# Patient Record
Sex: MALE | Race: BLACK OR AFRICAN AMERICAN | NOT HISPANIC OR LATINO | Employment: FULL TIME | URBAN - METROPOLITAN AREA
[De-identification: names, ages, dates, MRNs, and addresses within clinical notes are randomized per-mention and may not be internally consistent; named-entity substitution may affect disease eponyms.]

---

## 2017-02-24 RX ORDER — LEVOTHYROXINE SODIUM 175 UG/1
TABLET ORAL
Qty: 30 TABLET | Refills: 0 | Status: SHIPPED | OUTPATIENT
Start: 2017-02-24 | End: 2017-04-11 | Stop reason: SDUPTHER

## 2017-03-27 DIAGNOSIS — R00.2 PALPITATIONS: Primary | ICD-10-CM

## 2017-04-03 ENCOUNTER — OFFICE VISIT (OUTPATIENT)
Dept: INTERNAL MEDICINE | Facility: CLINIC | Age: 62
End: 2017-04-03
Payer: COMMERCIAL

## 2017-04-03 ENCOUNTER — TELEPHONE (OUTPATIENT)
Dept: INTERNAL MEDICINE | Facility: CLINIC | Age: 62
End: 2017-04-03

## 2017-04-03 ENCOUNTER — HOSPITAL ENCOUNTER (OUTPATIENT)
Dept: RADIOLOGY | Facility: HOSPITAL | Age: 62
Discharge: HOME | End: 2017-04-03
Attending: NURSE PRACTITIONER
Payer: COMMERCIAL

## 2017-04-03 VITALS
WEIGHT: 270.06 LBS | SYSTOLIC BLOOD PRESSURE: 140 MMHG | HEART RATE: 102 BPM | BODY MASS INDEX: 36.58 KG/M2 | DIASTOLIC BLOOD PRESSURE: 78 MMHG | HEIGHT: 72 IN | OXYGEN SATURATION: 95 %

## 2017-04-03 DIAGNOSIS — M79.672 LEFT FOOT PAIN: ICD-10-CM

## 2017-04-03 DIAGNOSIS — E11.8 TYPE 2 DIABETES MELLITUS WITH COMPLICATION, WITHOUT LONG-TERM CURRENT USE OF INSULIN: Chronic | ICD-10-CM

## 2017-04-03 DIAGNOSIS — J30.9 ALLERGIC RHINITIS, UNSPECIFIED ALLERGIC RHINITIS TRIGGER, UNSPECIFIED RHINITIS SEASONALITY: ICD-10-CM

## 2017-04-03 DIAGNOSIS — M79.672 LEFT FOOT PAIN: Primary | ICD-10-CM

## 2017-04-03 PROCEDURE — 1160F RVW MEDS BY RX/DR IN RCRD: CPT | Mod: S$GLB,,, | Performed by: NURSE PRACTITIONER

## 2017-04-03 PROCEDURE — 99999 PR PBB SHADOW E&M-EST. PATIENT-LVL IV: CPT | Mod: PBBFAC,,, | Performed by: NURSE PRACTITIONER

## 2017-04-03 PROCEDURE — 3060F POS MICROALBUMINURIA REV: CPT | Mod: S$GLB,,, | Performed by: NURSE PRACTITIONER

## 2017-04-03 PROCEDURE — 73630 X-RAY EXAM OF FOOT: CPT | Mod: TC,LT

## 2017-04-03 PROCEDURE — 99214 OFFICE O/P EST MOD 30 MIN: CPT | Mod: S$GLB,,, | Performed by: NURSE PRACTITIONER

## 2017-04-03 PROCEDURE — 3046F HEMOGLOBIN A1C LEVEL >9.0%: CPT | Mod: S$GLB,,, | Performed by: NURSE PRACTITIONER

## 2017-04-03 PROCEDURE — 73630 X-RAY EXAM OF FOOT: CPT | Mod: 26,LT,, | Performed by: RADIOLOGY

## 2017-04-03 RX ORDER — EMPAGLIFLOZIN AND LINAGLIPTIN 25; 5 MG/1; MG/1
1 TABLET, FILM COATED ORAL DAILY
COMMUNITY
Start: 2017-01-23

## 2017-04-03 RX ORDER — DICLOFENAC SODIUM 75 MG/1
75 TABLET, DELAYED RELEASE ORAL 2 TIMES DAILY
COMMUNITY

## 2017-04-03 RX ORDER — TRAMADOL HYDROCHLORIDE 50 MG/1
50 TABLET ORAL
COMMUNITY
Start: 2017-02-24

## 2017-04-03 RX ORDER — GABAPENTIN 600 MG/1
1 TABLET ORAL DAILY
COMMUNITY
Start: 2017-03-03

## 2017-04-03 NOTE — PROGRESS NOTES
Subjective:       Patient ID: Hany Davis is a 61 y.o. male.    Chief Complaint: Foot Pain (left foot ) and Cough (lingering cold)    HPI Comments: Mr. Hany Davis is an established patient of Dr. Vahid Aguilera here today for complaint of pain to left foot.  His fiance is present with him for today's visit.  Relates that pain started 3 days ago to left foot.  He relates that there was no injury but his fiance shared that he did drop a travel suite case on his foot.  However, the patient reports that the pain has been ongoing but has gotten worse in the past 3 days.  He also relates that he has not been following the best diet or monitoring his blood sugars as he should.  He shared that he has been seen by an outside UC for other complaints but was unable to be seen there today because he was too late to be seen as a walk-in.  Also, relates that he has cold symptoms which include nasal congestion and cough but they are slowing resolving.      Foot Pain   This is a new problem. The current episode started in the past 7 days. The problem occurs constantly. The problem has been unchanged. Associated symptoms include coughing and joint swelling (left ankle swelling). Pertinent negatives include no abdominal pain, chest pain, congestion, fatigue, fever, nausea, numbness, sore throat, vomiting or weakness. The symptoms are aggravated by standing and walking. Treatments tried: tramadol taking for knee pain but has not resolved foot pain. The treatment provided no relief.   Cough   This is a recurrent problem. The current episode started in the past 7 days. The problem has been gradually improving. Associated symptoms include nasal congestion. Pertinent negatives include no chest pain, ear pain, fever, rhinorrhea, sore throat, shortness of breath or wheezing. Nothing aggravates the symptoms. Treatments tried: Flonase  There is no history of environmental allergies.     Review of Systems   Constitutional: Negative for  fatigue and fever.   HENT: Negative for congestion, ear pain, rhinorrhea, sinus pressure and sore throat.    Eyes: Negative for visual disturbance.   Respiratory: Positive for cough. Negative for chest tightness, shortness of breath and wheezing.    Cardiovascular: Negative for chest pain.   Gastrointestinal: Negative for abdominal pain, nausea and vomiting.   Endocrine: Negative for polydipsia, polyphagia and polyuria.   Genitourinary: Negative for urgency.   Musculoskeletal: Positive for joint swelling (left ankle swelling). Negative for back pain.   Allergic/Immunologic: Negative for environmental allergies and food allergies.   Neurological: Negative for dizziness, tremors, syncope, weakness, light-headedness and numbness.   Hematological: Negative for adenopathy. Does not bruise/bleed easily.   Psychiatric/Behavioral: Negative for behavioral problems, decreased concentration and suicidal ideas.       Objective:      Physical Exam   Constitutional: He is oriented to person, place, and time. He appears well-developed and well-nourished.   HENT:   Head: Normocephalic and atraumatic.   Right Ear: Tympanic membrane normal.   Left Ear: Tympanic membrane normal.   Nose: Mucosal edema present. No rhinorrhea. Right sinus exhibits no maxillary sinus tenderness and no frontal sinus tenderness. Left sinus exhibits no maxillary sinus tenderness and no frontal sinus tenderness.   Mouth/Throat: Oropharynx is clear and moist. No oropharyngeal exudate.   Eyes: Pupils are equal, round, and reactive to light.   Cardiovascular: Normal rate, regular rhythm, normal heart sounds and intact distal pulses.  Exam reveals no gallop and no friction rub.    No murmur heard.  Pulses:       Dorsalis pedis pulses are 2+ on the right side, and 2+ on the left side.        Posterior tibial pulses are 2+ on the right side, and 2+ on the left side.   Pulmonary/Chest: Effort normal and breath sounds normal. No respiratory distress. He has no  wheezes. He has no rales. He exhibits no tenderness.   Abdominal: Soft. Bowel sounds are normal. He exhibits no distension. There is no tenderness.   obese   Musculoskeletal: Normal range of motion. He exhibits no deformity.        Right foot: There is normal range of motion and no deformity.        Left foot: There is normal range of motion and no deformity.   Feet:   Right Foot:   Protective Sensation: 6 sites tested. 6 sites sensed.   Left Foot:   Protective Sensation: 6 sites tested. 6 sites sensed.   Skin Integrity: Positive for erythema (ankle).   Neurological: He is alert and oriented to person, place, and time.   Skin: Skin is warm and dry.   Psychiatric: He has a normal mood and affect. Thought content normal.       Assessment:       1. Left foot pain    2. Type 2 diabetes mellitus with complication, without long-term current use of insulin    3. Allergic rhinitis, unspecified allergic rhinitis trigger, unspecified rhinitis seasonality        Plan:       Hany was seen today for foot pain and cough.    Diagnoses and all orders for this visit:    Left foot pain  -     Uric acid; Future  -     X-Ray Foot Complete Left; Future  -     CBC auto differential; Future  -     Comprehensive metabolic panel; Future  -     Hemoglobin A1c; Future    Type 2 diabetes mellitus with complication, without long-term current use of insulin  -     Comprehensive metabolic panel; Future  -     Hemoglobin A1c; Future    Allergic rhinitis, unspecified allergic rhinitis trigger, unspecified rhinitis seasonality  Comments:  Flonase (OTC) nasal spray 1 spray to each nare daily while symptoms persist.            Return in about 7 days (around 4/10/2017), or if symptoms worsen or fail to improve, for Follow up with PCP.     Pt has been given instructions populated from [employer] database and has verbalized understanding of the after visit summary and information contained wherein.      JAN Fam

## 2017-04-03 NOTE — PROGRESS NOTES
I attest that this patient was seen and evaluated by PINEDA Jacobson , then presented to me. I then examined the patient independently and together we agreed on a diagnosis and treatment plan which was then presented to the patient. See her note dated today for full visit information.    Subjective:        Patient ID: Hany Davis is a 61 y.o. male.     Chief Complaint: Foot Pain (left foot ) and Cough (lingering cold)     HPI Comments: Mr. Hany Davis is an established patient of Dr. Vahid Aguilera here today for complaint of pain to left foot. His fiance is present with him for today's visit. Relates that pain started 3 days ago to left foot. He relates that there was no injury but his fiance shared that he did drop a travel suite case on his foot. However, the patient reports that the pain has been ongoing but has gotten worse in the past 3 days. He also relates that he has not been following the best diet or monitoring his blood sugars as he should. He shared that he has been seen by an outside UC for other complaints but was unable to be seen there today because he was too late to be seen as a walk-in. Also, relates that he has cold symptoms which include nasal congestion and cough but they are slowing resolving.     Foot Pain   This is a new problem. The current episode started in the past 7 days. The problem occurs constantly. The problem has been unchanged. Associated symptoms include coughing and joint swelling (left ankle swelling). Pertinent negatives include no abdominal pain, chest pain, congestion, fatigue, fever, nausea, numbness, sore throat, vomiting or weakness. The symptoms are aggravated by standing and walking. Treatments tried: tramadol taking for knee pain but has not resolved foot pain. The treatment provided no relief.   Cough   This is a recurrent problem. The current episode started in the past 7 days. The problem has been gradually improving. Associated symptoms include nasal  congestion. Pertinent negatives include no chest pain, ear pain, fever, rhinorrhea, sore throat, shortness of breath or wheezing. Nothing aggravates the symptoms. Treatments tried: Flonase There is no history of environmental allergies.      Review of Systems   Constitutional: Negative for fatigue and fever.   HENT: Negative for congestion, ear pain, rhinorrhea, sinus pressure and sore throat.   Eyes: Negative for visual disturbance.   Respiratory: Positive for cough. Negative for chest tightness, shortness of breath and wheezing.   Cardiovascular: Negative for chest pain.   Gastrointestinal: Negative for abdominal pain, nausea and vomiting.   Endocrine: Negative for polydipsia, polyphagia and polyuria.   Genitourinary: Negative for urgency.   Musculoskeletal: Positive for joint swelling (left ankle swelling). Negative for back pain.   Allergic/Immunologic: Negative for environmental allergies and food allergies.   Neurological: Negative for dizziness, tremors, syncope, weakness, light-headedness and numbness.   Hematological: Negative for adenopathy. Does not bruise/bleed easily.   Psychiatric/Behavioral: Negative for behavioral problems, decreased concentration and suicidal ideas.       Objective:      Physical Exam   Constitutional: He is oriented to person, place, and time. He appears well-developed and well-nourished.   HENT:   Head: Normocephalic and atraumatic.   Right Ear: Tympanic membrane normal.   Left Ear: Tympanic membrane normal.   Nose: Mucosal edema present. No rhinorrhea. Right sinus exhibits no maxillary sinus tenderness and no frontal sinus tenderness. Left sinus exhibits no maxillary sinus tenderness and no frontal sinus tenderness.   Mouth/Throat: Oropharynx is clear and moist. No oropharyngeal exudate.   Eyes: Pupils are equal, round, and reactive to light.   Cardiovascular: Normal rate, regular rhythm, normal heart sounds and intact distal pulses. Exam reveals no gallop and no friction rub.    No murmur heard.  Pulses:  Dorsalis pedis pulses are 2+ on the right side, and 2+ on the left side.   Posterior tibial pulses are 2+ on the right side, and 2+ on the left side.   Pulmonary/Chest: Effort normal and breath sounds normal. No respiratory distress. He has no wheezes. He has no rales. He exhibits no tenderness.   Abdominal: Soft. Bowel sounds are normal. He exhibits no distension. There is no tenderness.   obese   Musculoskeletal: Normal range of motion. He exhibits no deformity.   Right foot: There is normal range of motion and no deformity.   Left foot: There is normal range of motion and no deformity.   Feet:   Right Foot:   Protective Sensation: 6 sites tested. 6 sites sensed.   Left Foot:   Protective Sensation: 6 sites tested. 6 sites sensed.   Skin Integrity: Positive for erythema (ankle).   Neurological: He is alert and oriented to person, place, and time.   Skin: Skin is warm and dry.   Psychiatric: He has a normal mood and affect. Thought content normal.       Assessment:       1. Left foot pain    2. Type 2 diabetes mellitus with complication, without long-term current use of insulin    3. Allergic rhinitis, unspecified allergic rhinitis trigger, unspecified rhinitis seasonality        Plan:       Hany was seen today for foot pain and cough.     Diagnoses and all orders for this visit:     Left foot pain  - Uric acid; Future  - X-Ray Foot Complete Left; Future  - CBC auto differential; Future  - Comprehensive metabolic panel; Future  - Hemoglobin A1c; Future     Type 2 diabetes mellitus with complication, without long-term current use of insulin  - Comprehensive metabolic panel; Future  - Hemoglobin A1c; Future     Allergic rhinitis, unspecified allergic rhinitis trigger, unspecified rhinitis seasonality  Comments:  Flonase (OTC) nasal spray 1 spray to each nare daily while symptoms persist.               Return in about 7 days (around 4/10/2017), or if symptoms worsen or fail to improve,  for Follow up with PCP.      Pt has been given instructions populated from [employer] database and has verbalized understanding of the after visit summary and information contained wherein.

## 2017-04-03 NOTE — PATIENT INSTRUCTIONS
Rest and elevation to left leg.  You may continue your Tramadol as needed for pain.  We will contact you with the results of your labs and X-ray.

## 2017-04-03 NOTE — MR AVS SNAPSHOT
[facility] Medicine  [address]  [facility] [address]  Phone: [phone]  Fax: [phone]                  Hany Davis   4/3/2017 3:30 PM   Office Visit    Description:  Male : 1955   Provider:  Cleo Oviedo NP   Department:  [facility] Internal Medicine           Reason for Visit     Foot Pain     Cough           Diagnoses this Visit        Comments    Type 2 diabetes mellitus with complication, without long-term current use of insulin    -  Primary     Left foot pain                To Do List           Future Appointments        Provider Department Dept Phone    4/3/2017 4:30 PM [facility] [id number] LIMIT [facility] [phone]    4/3/2017 4:40 PM LAB, APPOINTMENT [facility] INTMED [facility] [phone]    4/10/2017 1:20 PM Vahid Aguilera MD [place] - Internal Medicine [phone]    2017 10:30 AM EKG, APPT [facility] [phone]    2017 11:00 AM Ely Boyer MD [facility] - Cardiology [phone]      Goals (5 Years of Data)     None      Follow-Up and Disposition     Return in about 7 days (around 4/10/2017), or if symptoms worsen or fail to improve, for Follow up with PCP.      [facility]     [facility] Nurse Care Line -  Assistance  Unless otherwise directed by your provider, please contact [facility], our nurse care line that is available for  assistance.     Registered nurses in the [facility] provide: appointment scheduling, clinical advisement, health education, and other advisory services.  Call: [phone] (toll free)               Medications           Message regarding Medications     Verify the changes and/or additions to your medication regime listed below are the same as discussed with your clinician today.  If any of these changes or additions are incorrect, please notify your healthcare provider.        STOP taking these medications     metformin  (GLUCOPHAGE-XR) 500 MG 24 hr tablet Take 1 tablet (500 mg total) by mouth 2 (two) times daily with meals.    naproxen (NAPROSYN) 500 MG tablet Take 1 tablet (500 mg total) by mouth 2 (two) times daily.    predniSONE (DELTASONE) 20 MG tablet            Verify that the below list of medications is an accurate representation of the medications you are currently taking.  If none reported, the list may be blank. If incorrect, please contact your healthcare provider. Carry this list with you in case of emergency.           Current Medications     diclofenac (VOLTAREN) 75 MG EC tablet Take 75 mg by mouth 2 (two) times daily.    gabapentin (NEURONTIN) 600 MG tablet Take 1 tablet by mouth once daily.    glipiZIDE (GLUCOTROL) 2.5 MG TR24 Take 2.5 mg by mouth daily with breakfast.    GLYXAMBI 25-5 mg Tab Take 1 tablet by mouth once daily.    levothyroxine (SYNTHROID, LEVOTHROID) 175 MCG tablet TAKE ONE TABLET BY MOUTH ONCE DAILY    ramipril (ALTACE) 10 MG capsule Take 1 capsule (10 mg total) by mouth once daily.    rosuvastatin (CRESTOR) 10 MG tablet Take 1 tablet (10 mg total) by mouth once daily.    tramadol (ULTRAM) 50 mg tablet Take 50 mg by mouth.     VOLTAREN 1 % Gel     lidocaine-prilocaine (EMLA) cream     omeprazole (PRILOSEC) 40 MG capsule Take 1 capsule (40 mg total) by mouth once daily.           Clinical Reference Information           Your Vitals Were     BP Pulse Height Weight SpO2 BMI    140/78 (BP Location: Left arm, Patient Position: Sitting, BP Method: Manual) 102 6' (1.829 m) 122.5 kg (270 lb 1 oz) 95% 36.63 kg/m2      Blood Pressure          Most Recent Value    BP  (!)  140/78      Allergies as of 4/3/2017     Morphine      Immunizations Administered on Date of Encounter - 4/3/2017     None      Orders Placed During Today's Visit     Future Labs/Procedures Expected by Expires    CBC auto differential  4/3/2017 4/3/2018    Comprehensive metabolic panel  4/3/2017 4/3/2018    Hemoglobin A1c  4/3/2017  7/2/2017    X-Ray Foot Complete Left  4/3/2017 7/2/2017    Uric acid  7/4/2017 6/2/2018      Instructions    Rest and elevation to left leg.  You may continue your Tramadol as needed for pain.  We will contact you with the results of your labs and X-ray.       Language Assistance Services     ATTENTION: Language assistance services are available, free of charge. Please call [phone].      ATENCIÓN: Si habla español, tiene a flowers disposición servicios gratuitos de asistencia lingüística. Llame al [phone].     [facility] Ý: N?u b?n nói Ti?ng Vi?t, có các d?ch v? h? tr? ngôn ng? mi?n phí dành cho b?n. G?i s? [phone].         [facility] complies with applicable Federal civil rights laws and does not discriminate on the basis of race, color, national origin, age, disability, or sex.

## 2017-04-11 RX ORDER — LEVOTHYROXINE SODIUM 175 UG/1
TABLET ORAL
Qty: 30 TABLET | Refills: 0 | Status: SHIPPED | OUTPATIENT
Start: 2017-04-11 | End: 2017-05-05 | Stop reason: SDUPTHER

## 2017-04-12 ENCOUNTER — OFFICE VISIT (OUTPATIENT)
Dept: INTERNAL MEDICINE | Facility: CLINIC | Age: 62
End: 2017-04-12
Payer: COMMERCIAL

## 2017-04-12 VITALS
HEART RATE: 84 BPM | WEIGHT: 268.94 LBS | HEIGHT: 72 IN | TEMPERATURE: 98 F | DIASTOLIC BLOOD PRESSURE: 80 MMHG | BODY MASS INDEX: 36.43 KG/M2 | SYSTOLIC BLOOD PRESSURE: 120 MMHG

## 2017-04-12 DIAGNOSIS — E11.8 TYPE 2 DIABETES MELLITUS WITH COMPLICATION, WITHOUT LONG-TERM CURRENT USE OF INSULIN: Chronic | ICD-10-CM

## 2017-04-12 DIAGNOSIS — M79.669 PAIN IN SHIN, UNSPECIFIED LATERALITY: ICD-10-CM

## 2017-04-12 DIAGNOSIS — M17.12 PRIMARY LOCALIZED OSTEOARTHROSIS, LOWER LEG, LEFT: ICD-10-CM

## 2017-04-12 DIAGNOSIS — M79.673 PAIN OF FOOT, UNSPECIFIED LATERALITY: Primary | ICD-10-CM

## 2017-04-12 PROCEDURE — 3060F POS MICROALBUMINURIA REV: CPT | Mod: S$GLB,,, | Performed by: INTERNAL MEDICINE

## 2017-04-12 PROCEDURE — 99214 OFFICE O/P EST MOD 30 MIN: CPT | Mod: S$GLB,,, | Performed by: INTERNAL MEDICINE

## 2017-04-12 PROCEDURE — 1160F RVW MEDS BY RX/DR IN RCRD: CPT | Mod: S$GLB,,, | Performed by: INTERNAL MEDICINE

## 2017-04-12 PROCEDURE — 99999 PR PBB SHADOW E&M-EST. PATIENT-LVL III: CPT | Mod: PBBFAC,,, | Performed by: INTERNAL MEDICINE

## 2017-04-12 PROCEDURE — 3046F HEMOGLOBIN A1C LEVEL >9.0%: CPT | Mod: S$GLB,,, | Performed by: INTERNAL MEDICINE

## 2017-04-12 RX ORDER — INSULIN PUMP SYRINGE, 3 ML
EACH MISCELLANEOUS
Qty: 1 EACH | Refills: 0 | Status: SHIPPED | OUTPATIENT
Start: 2017-04-12

## 2017-04-12 RX ORDER — CICLOPIROX 80 MG/ML
1 SOLUTION TOPICAL
COMMUNITY
Start: 2017-04-07

## 2017-04-12 RX ORDER — LANCETS
1 EACH MISCELLANEOUS DAILY
Qty: 100 EACH | Refills: 3 | Status: SHIPPED | OUTPATIENT
Start: 2017-04-12

## 2017-04-12 NOTE — PROGRESS NOTES
Subjective:       Patient ID: Hany Davis is a 61 y.o. male.    Chief Complaint: Follow-up (left foot pain) and Leg Pain (biateral ankle and shin)    HPI     61-year-old male here for follow-up of left foot pain and bilateral shin and ankle pain.  He reports that he has had problems with his feet with plantar fasciitis.  He has iced it down and dropped a suitcase on his foot.  He has trouble with his knees as well.  He dropped the suitcase about a month ago.  Th foot has been painful since he dropped the suitcase on his foot.  The shins have been painful for at least 5-6 weeks.  He reports no trauma to his shins.  He has flat feet and bad knees.  He would like to see ortho about his knees.    Diabetes - on glipizide 2.5 mg daily and glyxambi 25-5 mg daily, trulicity weekly.    Review of Systems    Objective:      Physical Exam   Constitutional: He is oriented to person, place, and time. He appears well-developed and well-nourished.   HENT:   Head: Normocephalic and atraumatic.   Mouth/Throat: No oropharyngeal exudate.   Eyes: EOM are normal. Pupils are equal, round, and reactive to light. Right eye exhibits no discharge. Left eye exhibits no discharge. No scleral icterus.   Neck: Normal range of motion. Neck supple. No tracheal deviation present. No thyromegaly present.   Cardiovascular: Normal rate, regular rhythm and normal heart sounds.  Exam reveals no gallop and no friction rub.    No murmur heard.  Pulmonary/Chest: Effort normal and breath sounds normal. No respiratory distress. He has no wheezes. He has no rales. He exhibits no tenderness.   Abdominal: Soft. Bowel sounds are normal. He exhibits no distension and no mass. There is no tenderness. There is no rebound and no guarding.   Musculoskeletal: Normal range of motion. He exhibits no edema or tenderness.   Neurological: He is alert and oriented to person, place, and time.   Skin: Skin is warm and dry. No rash noted. No erythema. No pallor.    Psychiatric: He has a normal mood and affect. His behavior is normal.   Vitals reviewed.      Assessment:       1. Pain of foot, unspecified laterality    2. Pain in shin, unspecified laterality    3. Primary localized osteoarthrosis, lower leg, left    4. Type 2 diabetes mellitus with complication, without long-term current use of insulin        Plan:       1/2.  Refer to podiatry  3.  Refer to orthopedics  4.  Continue glipizide 2.5 mg, trulicity 1.5 mg, glyxambi 25-5 mg.  RTC 1 month

## 2017-04-14 ENCOUNTER — NURSE TRIAGE (OUTPATIENT)
Dept: ADMINISTRATIVE | Facility: CLINIC | Age: 62
End: 2017-04-14

## 2017-04-18 ENCOUNTER — OFFICE VISIT (OUTPATIENT)
Dept: ORTHOPEDICS | Facility: CLINIC | Age: 62
End: 2017-04-18
Payer: COMMERCIAL

## 2017-04-18 ENCOUNTER — HOSPITAL ENCOUNTER (OUTPATIENT)
Dept: RADIOLOGY | Facility: HOSPITAL | Age: 62
Discharge: HOME | End: 2017-04-18
Attending: ORTHOPAEDIC SURGERY
Payer: COMMERCIAL

## 2017-04-18 VITALS — BODY MASS INDEX: 36.91 KG/M2 | WEIGHT: 272.5 LBS | HEIGHT: 72 IN

## 2017-04-18 DIAGNOSIS — M17.0 OSTEOARTHRITIS OF BOTH KNEES, UNSPECIFIED OSTEOARTHRITIS TYPE: Primary | ICD-10-CM

## 2017-04-18 DIAGNOSIS — M25.561 PAIN IN BOTH KNEES, UNSPECIFIED CHRONICITY: ICD-10-CM

## 2017-04-18 DIAGNOSIS — M25.562 PAIN IN BOTH KNEES, UNSPECIFIED CHRONICITY: ICD-10-CM

## 2017-04-18 PROCEDURE — 99999 PR PBB SHADOW E&M-EST. PATIENT-LVL III: CPT | Mod: PBBFAC,,, | Performed by: PHYSICIAN ASSISTANT

## 2017-04-18 PROCEDURE — 73564 X-RAY EXAM KNEE 4 OR MORE: CPT | Mod: TC,50

## 2017-04-18 PROCEDURE — 1160F RVW MEDS BY RX/DR IN RCRD: CPT | Mod: S$GLB,,, | Performed by: PHYSICIAN ASSISTANT

## 2017-04-18 PROCEDURE — 73564 X-RAY EXAM KNEE 4 OR MORE: CPT | Mod: 26,50,, | Performed by: RADIOLOGY

## 2017-04-18 PROCEDURE — 99213 OFFICE O/P EST LOW 20 MIN: CPT | Mod: S$GLB,,, | Performed by: PHYSICIAN ASSISTANT

## 2017-04-18 RX ORDER — HYALURONATE SODIUM 20 MG/2 ML
4 SYRINGE (ML) INTRAARTICULAR WEEKLY
Status: SHIPPED | OUTPATIENT
Start: 2017-05-02 | End: 2017-05-23

## 2017-04-18 NOTE — PROGRESS NOTES
"Subjective:      Patient ID: Hany Davis is a 61 y.o. male.    Chief Complaint: No chief complaint on file.    HPI  61 year old male presents with chief complaint of bilateral knee pain L>R x "years." He denies trauma. He had bilateral knee scope years ago. Pain is worse with walking. He is taking diclofenac with little relief. He had cortisone injections in the past with short relief. He had viscosupplementation in the past and says it provided better relief. He does not use assistive devices.   Review of Systems   Constitution: Negative for chills, fever and night sweats.   Cardiovascular: Negative for chest pain.   Respiratory: Negative for cough and shortness of breath.    Hematologic/Lymphatic: Does not bruise/bleed easily.   Skin: Negative for color change.   Gastrointestinal: Negative for heartburn.   Genitourinary: Negative for dysuria.   Neurological: Negative for numbness and paresthesias.   Psychiatric/Behavioral: Negative for altered mental status.   Allergic/Immunologic: Negative for persistent infections.         Objective:            General    Vitals reviewed.  Constitutional: He is oriented to person, place, and time. He appears well-developed and well-nourished.   Cardiovascular: Normal rate.    Neurological: He is alert and oriented to person, place, and time.             Right Knee Exam:  ROM 0-120 degrees  +crepitus  No effusion  No TTP    Left Knee Exam:  ROM 5-120 degrees  +crepitus  No effusion  TTP medial joint line    X-ray: ordered and reviewed by myself. Severe DJD.       Assessment:       Encounter Diagnosis   Name Primary?    Osteoarthritis of both knees, unspecified osteoarthritis type Yes          Plan:       Discussed treatment options with patient. He is not interested in surgery. He would like to repeat viscosupplementation. Order placed for euflexxa. RTC in 2 weeks for first injections pending insurance approval.             "

## 2017-04-18 NOTE — LETTER
April 18, 2017      Vahid Aguilera MD  [address] [facility]  [place] [address]           [facility]  [address]  [place] [address]  Phone: [phone]          Patient: Hany Davis   MR Number: [id number]   YOB: 1955   Date of Visit: 4/18/2017       Dear Dr. Vahid Aguilera:    Thank you for referring Hany Davis to me for evaluation. Attached you will find relevant portions of my assessment and plan of care.    If you have questions, please do not hesitate to call me. I look forward to following Hany Davis along with you.    Sincerely,    Marine Ceja PA-C    Enclosure  CC:  No Recipients    If you would like to receive this communication electronically, please contact [email or web address] or [phone] to request more information on [employer] access.    For providers and/or their staff who would like to refer a patient to [facility], please contact us through our one-stop-shop provider referral line, [facility], at [phone].    If you feel you have received this communication in error or would no longer like to receive these types of communications, please e-mail [email or web address]

## 2017-04-18 NOTE — MR AVS SNAPSHOT
[facility]  [address]  [facility] [address]  Phone: [phone]                  Hany Davis   2017 9:45 AM   Appointment    Description:  Male : 1955   Provider:  Marine Ceja PA-C   Department:  [facility]                To Do List           Future Appointments        Provider Department Dept Phone    2017 9:45 AM Marine Ceja PA-C [facility] [phone]    2017 10:00 AM Sury Bernal DPM [facility] Pod[facility]ry [phone]    2017 10:40 AM Vahid Aguilera MD [facility] Internal Medicine [phone]    2017 10:30 AM EKG, APPT [facility] - [phone]    2017 11:00 AM Ely Boyer MD [facility] - Cardiology [phone]      Goals (5 Years of Data)     None      [facility]     [facility] -  Assistance  Unless otherwise directed by your provider, please contact [facility], our nurse care line that is available for  assistance.     Registered nurses in the [facility] provide: appointment scheduling, clinical advisement, health education, and other advisory services.  Call: [phone] (toll free)               Medications           Message regarding Medications     Verify the changes and/or additions to your medication regime listed below are the same as discussed with your clinician today.  If any of these changes or additions are incorrect, please notify your healthcare provider.             Verify that the below list of medications is an accurate representation of the medications you are currently taking.  If none reported, the list may be blank. If incorrect, please contact your healthcare provider. Carry this list with you in case of emergency.           Current Medications     blood sugar diagnostic Strp 1 each by Misc.(Non-Drug; Combo Route) route once daily. Compatible with Contour next EZ    blood-glucose meter kit Use as instructed. Contour next  EZ    ciclopirox (PENLAC) 8 % Soln 1 application.    diclofenac (VOLTAREN) 75 MG EC tablet Take 75 mg by mouth 2 (two) times daily.    dulaglutide (TRULICITY) 1.5 mg/0.5 mL PnIj Inject 1.5 mg into the skin every 7 days.    gabapentin (NEURONTIN) 600 MG tablet Take 1 tablet by mouth once daily.    glipiZIDE (GLUCOTROL) 2.5 MG TR24 Take 2.5 mg by mouth daily with breakfast.    GLYXAMBI 25-5 mg Tab Take 1 tablet by mouth once daily.    lancets Misc 1 each by Misc.(Non-Drug; Combo Route) route once daily. Compatible with Contour next EZ    levothyroxine (SYNTHROID, LEVOTHROID) 175 MCG tablet TAKE ONE TABLET BY MOUTH ONCE DAILY    lidocaine-prilocaine (EMLA) cream     ramipril (ALTACE) 10 MG capsule Take 1 capsule (10 mg total) by mouth once daily.    tramadol (ULTRAM) 50 mg tablet Take 50 mg by mouth.     VOLTAREN 1 % Gel            Clinical Reference Information           Allergies as of 4/18/2017     Morphine      Immunizations Administered on Date of Encounter - 4/18/2017     None      Language Assistance Services     ATTENTION: Language assistance services are available, free of charge. Please call [phone].      ATENCIÓN: Si courtney maricel, tiene a flowers disposición servicios gratuitos de asistencia lingüística. Llame al [phone].     OSBALDO Ý: N?u b?n nói Ti?ng Vi?t, có các d?ch v? h? tr? ngôn ng? mi?n phí dành cho b?n. G?i s? [phone].         [facility] complies with applicable Federal civil rights laws and does not discriminate on the basis of race, color, national origin, age, disability, or sex.

## 2017-05-05 RX ORDER — RAMIPRIL 10 MG/1
CAPSULE ORAL
Qty: 30 CAPSULE | Refills: 11 | Status: SHIPPED | OUTPATIENT
Start: 2017-05-05

## 2017-05-05 RX ORDER — LEVOTHYROXINE SODIUM 175 UG/1
TABLET ORAL
Qty: 30 TABLET | Refills: 11 | Status: SHIPPED | OUTPATIENT
Start: 2017-05-05

## 2017-05-10 ENCOUNTER — TELEPHONE (OUTPATIENT)
Dept: ORTHOPEDICS | Facility: CLINIC | Age: 62
End: 2017-05-10

## 2017-05-10 NOTE — TELEPHONE ENCOUNTER
----- Message from Abisai Pierre sent at 5/10/2017  8:45 AM CDT -----  Contact: Junior [phone]  Les ask for a call in regards to rescheduling appointment.

## 2017-07-21 DIAGNOSIS — E11.9 TYPE 2 DIABETES MELLITUS WITHOUT COMPLICATION: ICD-10-CM

## 2017-10-20 DIAGNOSIS — Z12.11 COLON CANCER SCREENING: ICD-10-CM

## 2017-10-20 DIAGNOSIS — E11.9 TYPE 2 DIABETES MELLITUS WITHOUT COMPLICATION: ICD-10-CM

## 2019-01-04 DIAGNOSIS — Z12.11 COLON CANCER SCREENING: ICD-10-CM
